# Patient Record
Sex: FEMALE | Race: WHITE | NOT HISPANIC OR LATINO | Employment: OTHER | URBAN - METROPOLITAN AREA
[De-identification: names, ages, dates, MRNs, and addresses within clinical notes are randomized per-mention and may not be internally consistent; named-entity substitution may affect disease eponyms.]

---

## 2017-02-14 ENCOUNTER — PREPPED CHART (OUTPATIENT)
Dept: URBAN - METROPOLITAN AREA CLINIC 36 | Facility: CLINIC | Age: 72
End: 2017-02-14

## 2017-06-26 ASSESSMENT — TONOMETRY
OD_IOP_MMHG: 11
OS_IOP_MMHG: 12

## 2017-06-26 ASSESSMENT — VISUAL ACUITY
OS_SC: 20/20-1
OD_SC: CF 2FT

## 2017-07-10 ENCOUNTER — ESTABLISHED PATIENT (OUTPATIENT)
Dept: URBAN - METROPOLITAN AREA CLINIC 36 | Facility: CLINIC | Age: 72
End: 2017-07-10

## 2017-07-10 DIAGNOSIS — H43.02: ICD-10-CM

## 2017-07-10 DIAGNOSIS — H35.351: ICD-10-CM

## 2017-07-10 DIAGNOSIS — H35.362: ICD-10-CM

## 2017-07-10 DIAGNOSIS — H33.021: ICD-10-CM

## 2017-07-10 DIAGNOSIS — H20.9: ICD-10-CM

## 2017-07-10 PROCEDURE — 92134 CPTRZ OPH DX IMG PST SGM RTA: CPT

## 2017-07-10 PROCEDURE — 92014 COMPRE OPH EXAM EST PT 1/>: CPT

## 2017-07-10 ASSESSMENT — VISUAL ACUITY
OD_SC: CF 2FT
OS_SC: 20/20-1

## 2017-07-10 ASSESSMENT — TONOMETRY
OS_IOP_MMHG: 14
OD_IOP_MMHG: 11

## 2018-01-15 ENCOUNTER — ESTABLISHED PATIENT (OUTPATIENT)
Dept: URBAN - METROPOLITAN AREA CLINIC 36 | Facility: CLINIC | Age: 73
End: 2018-01-15

## 2018-01-15 DIAGNOSIS — H33.021: ICD-10-CM

## 2018-01-15 DIAGNOSIS — H35.362: ICD-10-CM

## 2018-01-15 DIAGNOSIS — H35.411: ICD-10-CM

## 2018-01-15 DIAGNOSIS — H43.02: ICD-10-CM

## 2018-01-15 PROCEDURE — 92014 COMPRE OPH EXAM EST PT 1/>: CPT

## 2018-01-15 PROCEDURE — 9222550 BILAT EXTENDED OPHTHALMOSCOPY, FIRST

## 2018-01-15 PROCEDURE — 92134 CPTRZ OPH DX IMG PST SGM RTA: CPT

## 2018-01-15 ASSESSMENT — VISUAL ACUITY
OS_CC: 20/20
OD_CC: CF 2FT
OD_CC: J1+

## 2018-06-04 ENCOUNTER — ESTABLISHED PATIENT (OUTPATIENT)
Dept: URBAN - METROPOLITAN AREA CLINIC 36 | Facility: CLINIC | Age: 73
End: 2018-06-04

## 2018-06-04 DIAGNOSIS — H43.02: ICD-10-CM

## 2018-06-04 DIAGNOSIS — H35.411: ICD-10-CM

## 2018-06-04 DIAGNOSIS — H35.362: ICD-10-CM

## 2018-06-04 DIAGNOSIS — H43.812: ICD-10-CM

## 2018-06-04 DIAGNOSIS — H33.021: ICD-10-CM

## 2018-06-04 PROCEDURE — 92134 CPTRZ OPH DX IMG PST SGM RTA: CPT

## 2018-06-04 PROCEDURE — 92014 COMPRE OPH EXAM EST PT 1/>: CPT

## 2018-06-04 PROCEDURE — 9222650 BILAT EXTENDED OPHTHALMOSCOPY, F/U

## 2018-06-04 ASSESSMENT — TONOMETRY
OD_IOP_MMHG: 14
OS_IOP_MMHG: 14

## 2018-06-04 ASSESSMENT — VISUAL ACUITY
OS_SC: 20/20-2
OD_SC: CF 1FT

## 2018-12-10 ENCOUNTER — ESTABLISHED COMPREHENSIVE EXAM (OUTPATIENT)
Dept: URBAN - METROPOLITAN AREA CLINIC 36 | Facility: CLINIC | Age: 73
End: 2018-12-10

## 2018-12-10 DIAGNOSIS — H43.02: ICD-10-CM

## 2018-12-10 DIAGNOSIS — H35.432: ICD-10-CM

## 2018-12-10 DIAGNOSIS — H35.362: ICD-10-CM

## 2018-12-10 DIAGNOSIS — H33.021: ICD-10-CM

## 2018-12-10 DIAGNOSIS — H43.812: ICD-10-CM

## 2018-12-10 DIAGNOSIS — H35.411: ICD-10-CM

## 2018-12-10 PROCEDURE — 1036F TOBACCO NON-USER: CPT

## 2018-12-10 PROCEDURE — G8756 NO BP MEASURE DOC: HCPCS

## 2018-12-10 PROCEDURE — G8427 DOCREV CUR MEDS BY ELIG CLIN: HCPCS

## 2018-12-10 PROCEDURE — 92134 CPTRZ OPH DX IMG PST SGM RTA: CPT

## 2018-12-10 PROCEDURE — G9903 PT SCRN TBCO ID AS NON USER: HCPCS

## 2018-12-10 PROCEDURE — 9222650 BILAT EXTENDED OPHTHALMOSCOPY, F/U

## 2018-12-10 PROCEDURE — 92014 COMPRE OPH EXAM EST PT 1/>: CPT

## 2018-12-10 ASSESSMENT — TONOMETRY
OS_IOP_MMHG: 15
OD_IOP_MMHG: 13

## 2018-12-10 ASSESSMENT — VISUAL ACUITY
OS_SC: 20/20-2
OD_SC: CF 1FT

## 2019-01-28 ENCOUNTER — EST. PATIENT EMERGENCY (OUTPATIENT)
Dept: URBAN - METROPOLITAN AREA CLINIC 36 | Facility: CLINIC | Age: 74
End: 2019-01-28

## 2019-01-28 DIAGNOSIS — H10.89: ICD-10-CM

## 2019-01-28 PROCEDURE — 92012 INTRM OPH EXAM EST PATIENT: CPT

## 2019-01-28 RX ORDER — FLUOROMETHOLONE 1 MG/ML
1 SUSPENSION/ DROPS OPHTHALMIC
Start: 2019-01-28

## 2019-01-28 ASSESSMENT — VISUAL ACUITY
OS_SC: 20/20-1
OD_SC: CF 1FT

## 2019-08-05 ENCOUNTER — ESTABLISHED COMPREHENSIVE EXAM (OUTPATIENT)
Dept: URBAN - METROPOLITAN AREA CLINIC 36 | Facility: CLINIC | Age: 74
End: 2019-08-05

## 2019-08-05 DIAGNOSIS — H10.89: ICD-10-CM

## 2019-08-05 DIAGNOSIS — H35.411: ICD-10-CM

## 2019-08-05 DIAGNOSIS — H33.021: ICD-10-CM

## 2019-08-05 DIAGNOSIS — H43.812: ICD-10-CM

## 2019-08-05 DIAGNOSIS — H35.362: ICD-10-CM

## 2019-08-05 DIAGNOSIS — H43.02: ICD-10-CM

## 2019-08-05 DIAGNOSIS — H35.432: ICD-10-CM

## 2019-08-05 PROCEDURE — 92014 COMPRE OPH EXAM EST PT 1/>: CPT

## 2019-08-05 PROCEDURE — 92134 CPTRZ OPH DX IMG PST SGM RTA: CPT

## 2019-08-05 PROCEDURE — 9222650 BILAT EXTENDED OPHTHALMOSCOPY, F/U

## 2019-08-05 ASSESSMENT — TONOMETRY
OS_IOP_MMHG: 15
OD_IOP_MMHG: 15

## 2019-08-05 ASSESSMENT — VISUAL ACUITY
OD_SC: CF 1FT
OS_SC: 20/20-2
OS_CC: J2

## 2020-02-24 ENCOUNTER — ESTABLISHED COMPREHENSIVE EXAM (OUTPATIENT)
Dept: URBAN - METROPOLITAN AREA CLINIC 36 | Facility: CLINIC | Age: 75
End: 2020-02-24

## 2020-02-24 DIAGNOSIS — H35.411: ICD-10-CM

## 2020-02-24 DIAGNOSIS — H33.021: ICD-10-CM

## 2020-02-24 DIAGNOSIS — H35.432: ICD-10-CM

## 2020-02-24 DIAGNOSIS — H43.812: ICD-10-CM

## 2020-02-24 DIAGNOSIS — H43.02: ICD-10-CM

## 2020-02-24 DIAGNOSIS — H10.89: ICD-10-CM

## 2020-02-24 DIAGNOSIS — H35.362: ICD-10-CM

## 2020-02-24 PROCEDURE — 92134 CPTRZ OPH DX IMG PST SGM RTA: CPT

## 2020-02-24 PROCEDURE — 92014 COMPRE OPH EXAM EST PT 1/>: CPT

## 2020-02-24 PROCEDURE — 92201 OPSCPY EXTND RTA DRAW UNI/BI: CPT

## 2020-02-24 ASSESSMENT — TONOMETRY
OS_IOP_MMHG: 17
OD_IOP_MMHG: 18

## 2020-02-24 ASSESSMENT — VISUAL ACUITY: OS_SC: 20/20

## 2020-08-24 ENCOUNTER — ESTABLISHED COMPREHENSIVE EXAM (OUTPATIENT)
Dept: URBAN - METROPOLITAN AREA CLINIC 36 | Facility: CLINIC | Age: 75
End: 2020-08-24

## 2020-08-24 VITALS — HEIGHT: 55 IN

## 2020-08-24 DIAGNOSIS — H35.362: ICD-10-CM

## 2020-08-24 DIAGNOSIS — H33.021: ICD-10-CM

## 2020-08-24 DIAGNOSIS — H43.812: ICD-10-CM

## 2020-08-24 DIAGNOSIS — H35.411: ICD-10-CM

## 2020-08-24 DIAGNOSIS — H10.89: ICD-10-CM

## 2020-08-24 DIAGNOSIS — H35.432: ICD-10-CM

## 2020-08-24 DIAGNOSIS — H43.02: ICD-10-CM

## 2020-08-24 PROCEDURE — 92201 OPSCPY EXTND RTA DRAW UNI/BI: CPT

## 2020-08-24 PROCEDURE — 92014 COMPRE OPH EXAM EST PT 1/>: CPT

## 2020-08-24 PROCEDURE — 92134 CPTRZ OPH DX IMG PST SGM RTA: CPT

## 2020-08-24 ASSESSMENT — TONOMETRY
OD_IOP_MMHG: 21
OS_IOP_MMHG: 18

## 2020-08-24 ASSESSMENT — VISUAL ACUITY: OS_SC: 20/20-1

## 2020-08-26 ENCOUNTER — ESTABLISHED COMPREHENSIVE EXAM (OUTPATIENT)
Dept: URBAN - METROPOLITAN AREA CLINIC 46 | Facility: CLINIC | Age: 75
End: 2020-08-26

## 2020-08-26 DIAGNOSIS — Z01.00: ICD-10-CM

## 2020-08-26 PROCEDURE — 92014 COMPRE OPH EXAM EST PT 1/>: CPT

## 2020-08-26 PROCEDURE — 92015 DETERMINE REFRACTIVE STATE: CPT

## 2020-08-26 ASSESSMENT — TONOMETRY
OD_IOP_MMHG: 24
OS_IOP_MMHG: 17

## 2020-08-26 ASSESSMENT — VISUAL ACUITY
OS_SC: 20/20-1
OS_SC: J1
OU_SC: J1+
OS_CC: J6

## 2020-09-10 NOTE — PATIENT DISCUSSION
Also, please do not hesitate to call us if you have any concerns not addressed by this information. Please call [phone] and we will do everything we can to help you during this period.

## 2021-02-22 ENCOUNTER — ESTABLISHED COMPREHENSIVE EXAM (OUTPATIENT)
Dept: URBAN - METROPOLITAN AREA CLINIC 36 | Facility: CLINIC | Age: 76
End: 2021-02-22

## 2021-02-22 VITALS — HEIGHT: 55 IN

## 2021-02-22 DIAGNOSIS — H33.021: ICD-10-CM

## 2021-02-22 DIAGNOSIS — H35.432: ICD-10-CM

## 2021-02-22 DIAGNOSIS — H35.411: ICD-10-CM

## 2021-02-22 DIAGNOSIS — H43.02: ICD-10-CM

## 2021-02-22 DIAGNOSIS — H43.812: ICD-10-CM

## 2021-02-22 DIAGNOSIS — H35.362: ICD-10-CM

## 2021-02-22 PROCEDURE — 92014 COMPRE OPH EXAM EST PT 1/>: CPT

## 2021-02-22 PROCEDURE — 92202 OPSCPY EXTND ON/MAC DRAW: CPT

## 2021-02-22 PROCEDURE — 92134 CPTRZ OPH DX IMG PST SGM RTA: CPT

## 2021-02-22 ASSESSMENT — TONOMETRY
OD_IOP_MMHG: 22
OS_IOP_MMHG: 17

## 2021-02-22 ASSESSMENT — VISUAL ACUITY: OS_SC: 20/20-1

## 2021-04-01 ENCOUNTER — EST. PATIENT EMERGENCY (OUTPATIENT)
Dept: URBAN - METROPOLITAN AREA CLINIC 43 | Facility: CLINIC | Age: 76
End: 2021-04-01

## 2021-04-01 DIAGNOSIS — H11.421: ICD-10-CM

## 2021-04-01 DIAGNOSIS — H02.401: ICD-10-CM

## 2021-04-01 PROCEDURE — 92012 INTRM OPH EXAM EST PATIENT: CPT

## 2021-04-01 RX ORDER — DUREZOL 0.5 MG/ML
1 EMULSION OPHTHALMIC TWICE A DAY
Start: 2021-04-01

## 2021-04-01 ASSESSMENT — TONOMETRY
OD_IOP_MMHG: 21
OS_IOP_MMHG: 13

## 2021-04-15 ENCOUNTER — FOLLOW UP (OUTPATIENT)
Dept: URBAN - METROPOLITAN AREA CLINIC 43 | Facility: CLINIC | Age: 76
End: 2021-04-15

## 2021-04-15 DIAGNOSIS — H33.021: ICD-10-CM

## 2021-04-15 DIAGNOSIS — H11.421: ICD-10-CM

## 2021-04-15 DIAGNOSIS — H02.401: ICD-10-CM

## 2021-04-15 PROCEDURE — 92012 INTRM OPH EXAM EST PATIENT: CPT

## 2021-04-15 RX ORDER — OXYMETAZOLINE HYDROCHLORIDE OPHTHALMIC 1 MG/ML
1 SOLUTION/ DROPS OPHTHALMIC ONCE A DAY
Start: 2021-04-15

## 2021-04-15 ASSESSMENT — TONOMETRY
OS_IOP_MMHG: 18
OD_IOP_MMHG: 20

## 2021-04-15 ASSESSMENT — VISUAL ACUITY: OS_SC: 20/25-2

## 2022-01-17 ENCOUNTER — ESTABLISHED PATIENT (OUTPATIENT)
Dept: URBAN - METROPOLITAN AREA CLINIC 44 | Facility: CLINIC | Age: 77
End: 2022-01-17

## 2022-01-17 DIAGNOSIS — H02.403: ICD-10-CM

## 2022-01-17 PROCEDURE — 92285 EXTERNAL OCULAR PHOTOGRAPHY: CPT

## 2022-01-17 PROCEDURE — 99213 OFFICE O/P EST LOW 20 MIN: CPT

## 2022-01-17 ASSESSMENT — VISUAL ACUITY: OS_SC: 20/25-2

## 2022-01-24 ENCOUNTER — TECH ONLY (OUTPATIENT)
Dept: URBAN - METROPOLITAN AREA CLINIC 36 | Facility: CLINIC | Age: 77
End: 2022-01-24

## 2022-01-24 ENCOUNTER — COMPREHENSIVE EXAM (OUTPATIENT)
Dept: URBAN - METROPOLITAN AREA CLINIC 36 | Facility: CLINIC | Age: 77
End: 2022-01-24

## 2022-01-24 DIAGNOSIS — H43.812: ICD-10-CM

## 2022-01-24 DIAGNOSIS — H02.403: ICD-10-CM

## 2022-01-24 DIAGNOSIS — H33.021: ICD-10-CM

## 2022-01-24 DIAGNOSIS — H35.411: ICD-10-CM

## 2022-01-24 DIAGNOSIS — H35.432: ICD-10-CM

## 2022-01-24 DIAGNOSIS — H35.362: ICD-10-CM

## 2022-01-24 DIAGNOSIS — H43.02: ICD-10-CM

## 2022-01-24 DIAGNOSIS — H02.839: ICD-10-CM

## 2022-01-24 PROCEDURE — 92202 OPSCPY EXTND ON/MAC DRAW: CPT

## 2022-01-24 PROCEDURE — 92014 COMPRE OPH EXAM EST PT 1/>: CPT

## 2022-01-24 PROCEDURE — 92134 CPTRZ OPH DX IMG PST SGM RTA: CPT

## 2022-01-24 PROCEDURE — 99211T TECH SERVICE

## 2022-01-24 PROCEDURE — 92082 INTERMEDIATE VISUAL FIELD XM: CPT

## 2022-01-24 ASSESSMENT — VISUAL ACUITY: OS_SC: 20/40

## 2022-01-24 ASSESSMENT — TONOMETRY
OS_IOP_MMHG: 17
OD_IOP_MMHG: 17

## 2022-11-15 ENCOUNTER — COMPREHENSIVE EXAM (OUTPATIENT)
Dept: URBAN - METROPOLITAN AREA CLINIC 36 | Facility: CLINIC | Age: 77
End: 2022-11-15

## 2022-11-15 DIAGNOSIS — H43.02: ICD-10-CM

## 2022-11-15 DIAGNOSIS — H35.362: ICD-10-CM

## 2022-11-15 DIAGNOSIS — H35.411: ICD-10-CM

## 2022-11-15 DIAGNOSIS — H33.021: ICD-10-CM

## 2022-11-15 DIAGNOSIS — H43.812: ICD-10-CM

## 2022-11-15 DIAGNOSIS — H35.30: ICD-10-CM

## 2022-11-15 DIAGNOSIS — D31.32: ICD-10-CM

## 2022-11-15 DIAGNOSIS — H35.432: ICD-10-CM

## 2022-11-15 PROCEDURE — 92250 FUNDUS PHOTOGRAPHY W/I&R: CPT

## 2022-11-15 PROCEDURE — 99214 OFFICE O/P EST MOD 30 MIN: CPT

## 2022-11-15 PROCEDURE — 92235 FLUORESCEIN ANGRPH MLTIFRAME: CPT

## 2022-11-15 ASSESSMENT — TONOMETRY
OD_IOP_MMHG: 16
OS_IOP_MMHG: 17

## 2022-11-15 ASSESSMENT — VISUAL ACUITY: OS_SC: 20/25

## 2023-04-10 ENCOUNTER — APPOINTMENT (RX ONLY)
Dept: URBAN - METROPOLITAN AREA CLINIC 150 | Facility: CLINIC | Age: 78
Setting detail: DERMATOLOGY
End: 2023-04-10

## 2023-04-10 DIAGNOSIS — D22 MELANOCYTIC NEVI: ICD-10-CM

## 2023-04-10 DIAGNOSIS — D36.1 BENIGN NEOPLASM OF PERIPHERAL NERVES AND AUTONOMIC NERVOUS SYSTEM: ICD-10-CM

## 2023-04-10 DIAGNOSIS — L72.0 EPIDERMAL CYST: ICD-10-CM

## 2023-04-10 DIAGNOSIS — D18.0 HEMANGIOMA: ICD-10-CM

## 2023-04-10 DIAGNOSIS — L90.5 SCAR CONDITIONS AND FIBROSIS OF SKIN: ICD-10-CM

## 2023-04-10 DIAGNOSIS — L57.8 OTHER SKIN CHANGES DUE TO CHRONIC EXPOSURE TO NONIONIZING RADIATION: ICD-10-CM

## 2023-04-10 DIAGNOSIS — L82.1 OTHER SEBORRHEIC KERATOSIS: ICD-10-CM

## 2023-04-10 DIAGNOSIS — I83.9 ASYMPTOMATIC VARICOSE VEINS OF LOWER EXTREMITIES: ICD-10-CM

## 2023-04-10 DIAGNOSIS — L81.4 OTHER MELANIN HYPERPIGMENTATION: ICD-10-CM

## 2023-04-10 DIAGNOSIS — Z80.8 FAMILY HISTORY OF MALIGNANT NEOPLASM OF OTHER ORGANS OR SYSTEMS: ICD-10-CM

## 2023-04-10 PROBLEM — I83.93 ASYMPTOMATIC VARICOSE VEINS OF BILATERAL LOWER EXTREMITIES: Status: ACTIVE | Noted: 2023-04-10

## 2023-04-10 PROBLEM — D22.61 MELANOCYTIC NEVI OF RIGHT UPPER LIMB, INCLUDING SHOULDER: Status: ACTIVE | Noted: 2023-04-10

## 2023-04-10 PROBLEM — D36.13 BENIGN NEOPLASM OF PERIPHERAL NERVES AND AUTONOMIC NERVOUS SYSTEM OF LOWER LIMB, INCLUDING HIP: Status: ACTIVE | Noted: 2023-04-10

## 2023-04-10 PROBLEM — D22.5 MELANOCYTIC NEVI OF TRUNK: Status: ACTIVE | Noted: 2023-04-10

## 2023-04-10 PROBLEM — D18.01 HEMANGIOMA OF SKIN AND SUBCUTANEOUS TISSUE: Status: ACTIVE | Noted: 2023-04-10

## 2023-04-10 PROBLEM — D23.5 OTHER BENIGN NEOPLASM OF SKIN OF TRUNK: Status: ACTIVE | Noted: 2023-04-10

## 2023-04-10 PROCEDURE — ? TREATMENT REGIMEN

## 2023-04-10 PROCEDURE — ? COUNSELING

## 2023-04-10 PROCEDURE — ? ADDITIONAL NOTES

## 2023-04-10 PROCEDURE — ? FULL BODY SKIN EXAM

## 2023-04-10 PROCEDURE — 99203 OFFICE O/P NEW LOW 30 MIN: CPT

## 2023-04-10 ASSESSMENT — LOCATION DETAILED DESCRIPTION DERM
LOCATION DETAILED: LEFT ANTECUBITAL SKIN
LOCATION DETAILED: PERIUMBILICAL SKIN
LOCATION DETAILED: RIGHT MEDIAL BREAST 4-5:00 REGION
LOCATION DETAILED: SUPERIOR THORACIC SPINE
LOCATION DETAILED: LEFT SUPERIOR CENTRAL MALAR CHEEK
LOCATION DETAILED: LEFT PROXIMAL DORSAL FOREARM
LOCATION DETAILED: RIGHT LATERAL SUPERIOR CHEST
LOCATION DETAILED: LEFT MEDIAL BREAST 8-9:00 REGION
LOCATION DETAILED: RIGHT SUPERIOR UPPER BACK
LOCATION DETAILED: RIGHT DISTAL DORSAL FOREARM
LOCATION DETAILED: RIGHT PROXIMAL POSTERIOR UPPER ARM
LOCATION DETAILED: RIGHT SUPERIOR LATERAL MALAR CHEEK
LOCATION DETAILED: LEFT MEDIAL MALAR CHEEK
LOCATION DETAILED: LEFT ANTERIOR PROXIMAL THIGH
LOCATION DETAILED: EPIGASTRIC SKIN
LOCATION DETAILED: LEFT CENTRAL LATERAL NECK
LOCATION DETAILED: RIGHT PROXIMAL DORSAL FOREARM
LOCATION DETAILED: STERNAL NOTCH
LOCATION DETAILED: RIGHT PROXIMAL PRETIBIAL REGION
LOCATION DETAILED: LEFT SUPERIOR LATERAL MALAR CHEEK
LOCATION DETAILED: LEFT MEDIAL FOREHEAD
LOCATION DETAILED: LEFT DISTAL CALF
LOCATION DETAILED: LEFT INFERIOR LATERAL MIDBACK
LOCATION DETAILED: RIGHT POPLITEAL SKIN
LOCATION DETAILED: RIGHT CENTRAL LATERAL NECK
LOCATION DETAILED: RIGHT RIB CAGE
LOCATION DETAILED: RIGHT BUTTOCK
LOCATION DETAILED: LEFT INFERIOR MEDIAL UPPER BACK
LOCATION DETAILED: RIGHT DORSAL FOOT
LOCATION DETAILED: RIGHT MID-UPPER BACK
LOCATION DETAILED: RIGHT KNEE
LOCATION DETAILED: LEFT LATERAL SUPERIOR CHEST
LOCATION DETAILED: RIGHT ANTERIOR DISTAL THIGH
LOCATION DETAILED: RIGHT SUPERIOR LATERAL MIDBACK
LOCATION DETAILED: RIGHT INFERIOR VERMILION LIP
LOCATION DETAILED: RIGHT DISTAL CALF
LOCATION DETAILED: RIGHT POSTERIOR SHOULDER
LOCATION DETAILED: RIGHT LATERAL ABDOMEN
LOCATION DETAILED: RIGHT MEDIAL SUPERIOR CHEST
LOCATION DETAILED: LEFT POSTERIOR SHOULDER
LOCATION DETAILED: LEFT DISTAL POSTERIOR THIGH
LOCATION DETAILED: RIGHT UPPER CUTANEOUS LIP
LOCATION DETAILED: SUPERIOR LUMBAR SPINE
LOCATION DETAILED: LEFT INFERIOR MEDIAL MALAR CHEEK
LOCATION DETAILED: LEFT ANTERIOR SHOULDER
LOCATION DETAILED: LEFT SUPERIOR MEDIAL BUCCAL CHEEK
LOCATION DETAILED: RIGHT INFERIOR LATERAL FOREHEAD
LOCATION DETAILED: LEFT PROXIMAL PRETIBIAL REGION
LOCATION DETAILED: LEFT MID-UPPER BACK
LOCATION DETAILED: RIGHT INFERIOR CENTRAL MALAR CHEEK
LOCATION DETAILED: LEFT BUTTOCK
LOCATION DETAILED: LEFT PROXIMAL POSTERIOR UPPER ARM
LOCATION DETAILED: LEFT RIB CAGE
LOCATION DETAILED: LEFT POPLITEAL SKIN
LOCATION DETAILED: RIGHT DISTAL POSTERIOR THIGH
LOCATION DETAILED: LEFT SUPERIOR UPPER BACK
LOCATION DETAILED: RIGHT ANTERIOR PROXIMAL THIGH
LOCATION DETAILED: LEFT SUPERIOR MEDIAL UPPER BACK
LOCATION DETAILED: RIGHT MID TEMPLE
LOCATION DETAILED: RIGHT ANTERIOR SHOULDER
LOCATION DETAILED: LEFT DISTAL POSTERIOR UPPER ARM
LOCATION DETAILED: RIGHT SUPERIOR MEDIAL BUCCAL CHEEK
LOCATION DETAILED: LEFT DISTAL PRETIBIAL REGION
LOCATION DETAILED: MIDDLE STERNUM
LOCATION DETAILED: RIGHT VENTRAL DISTAL FOREARM
LOCATION DETAILED: RIGHT PROXIMAL CALF
LOCATION DETAILED: LEFT ANTERIOR DISTAL THIGH

## 2023-04-10 ASSESSMENT — LOCATION SIMPLE DESCRIPTION DERM
LOCATION SIMPLE: RIGHT BUTTOCK
LOCATION SIMPLE: LEFT FOREARM
LOCATION SIMPLE: LEFT THIGH
LOCATION SIMPLE: RIGHT SHOULDER
LOCATION SIMPLE: RIGHT TEMPLE
LOCATION SIMPLE: RIGHT FOOT
LOCATION SIMPLE: CHEST
LOCATION SIMPLE: RIGHT BREAST
LOCATION SIMPLE: LEFT POSTERIOR THIGH
LOCATION SIMPLE: RIGHT LOWER BACK
LOCATION SIMPLE: LEFT BUTTOCK
LOCATION SIMPLE: LEFT CALF
LOCATION SIMPLE: RIGHT CHEEK
LOCATION SIMPLE: UPPER BACK
LOCATION SIMPLE: LOWER BACK
LOCATION SIMPLE: RIGHT KNEE
LOCATION SIMPLE: ABDOMEN
LOCATION SIMPLE: LEFT CHEEK
LOCATION SIMPLE: RIGHT CALF
LOCATION SIMPLE: LEFT UPPER BACK
LOCATION SIMPLE: RIGHT THIGH
LOCATION SIMPLE: NECK
LOCATION SIMPLE: LEFT PRETIBIAL REGION
LOCATION SIMPLE: LEFT FOREHEAD
LOCATION SIMPLE: RIGHT POPLITEAL SKIN
LOCATION SIMPLE: RIGHT FOREARM
LOCATION SIMPLE: LEFT UPPER ARM
LOCATION SIMPLE: RIGHT FOREHEAD
LOCATION SIMPLE: RIGHT UPPER BACK
LOCATION SIMPLE: LEFT POSTERIOR UPPER ARM
LOCATION SIMPLE: LEFT SHOULDER
LOCATION SIMPLE: LEFT LOWER BACK
LOCATION SIMPLE: RIGHT POSTERIOR THIGH
LOCATION SIMPLE: RIGHT PRETIBIAL REGION
LOCATION SIMPLE: LEFT BREAST
LOCATION SIMPLE: RIGHT POSTERIOR UPPER ARM
LOCATION SIMPLE: LEFT POPLITEAL SKIN
LOCATION SIMPLE: RIGHT LIP

## 2023-04-10 ASSESSMENT — LOCATION ZONE DERM
LOCATION ZONE: FEET
LOCATION ZONE: ARM
LOCATION ZONE: LIP
LOCATION ZONE: TRUNK
LOCATION ZONE: NECK
LOCATION ZONE: FACE
LOCATION ZONE: LEG

## 2023-04-10 NOTE — PROCEDURE: TREATMENT REGIMEN
Initiate Treatment: Patient advised to apply sunscreen with spf30 or higher, to be applied daily.
Detail Level: Zone

## 2023-05-17 ENCOUNTER — COMPREHENSIVE EXAM (OUTPATIENT)
Dept: URBAN - METROPOLITAN AREA CLINIC 36 | Facility: CLINIC | Age: 78
End: 2023-05-17

## 2023-05-17 DIAGNOSIS — H33.021: ICD-10-CM

## 2023-05-17 DIAGNOSIS — H40.022: ICD-10-CM

## 2023-05-17 DIAGNOSIS — H35.411: ICD-10-CM

## 2023-05-17 DIAGNOSIS — H35.432: ICD-10-CM

## 2023-05-17 DIAGNOSIS — H43.02: ICD-10-CM

## 2023-05-17 DIAGNOSIS — D31.32: ICD-10-CM

## 2023-05-17 PROCEDURE — 92015 DETERMINE REFRACTIVE STATE: CPT

## 2023-05-17 PROCEDURE — 92014 COMPRE OPH EXAM EST PT 1/>: CPT

## 2023-05-17 ASSESSMENT — VISUAL ACUITY
OS_SC: 20/20
OS_SC: J3

## 2023-05-17 ASSESSMENT — TONOMETRY
OS_IOP_MMHG: 16
OD_IOP_MMHG: 18

## 2023-11-28 ENCOUNTER — FOLLOW UP (OUTPATIENT)
Dept: URBAN - METROPOLITAN AREA CLINIC 36 | Facility: CLINIC | Age: 78
End: 2023-11-28

## 2023-11-28 DIAGNOSIS — H40.022: ICD-10-CM

## 2023-11-28 PROCEDURE — 92014 COMPRE OPH EXAM EST PT 1/>: CPT

## 2023-11-28 PROCEDURE — 92133 CPTRZD OPH DX IMG PST SGM ON: CPT

## 2023-11-28 PROCEDURE — 76514 ECHO EXAM OF EYE THICKNESS: CPT

## 2023-12-01 NOTE — PATIENT DISCUSSION
Patient Education: Spoke with patient to confirm her follow-up appt with Estevan Oreilly on 12/20 at 1:30pm.